# Patient Record
Sex: MALE | Race: WHITE | Employment: FULL TIME | URBAN - METROPOLITAN AREA
[De-identification: names, ages, dates, MRNs, and addresses within clinical notes are randomized per-mention and may not be internally consistent; named-entity substitution may affect disease eponyms.]

---

## 2024-09-18 ENCOUNTER — TELEPHONE (OUTPATIENT)
Age: 39
End: 2024-09-18

## 2024-09-25 NOTE — TELEPHONE ENCOUNTER
HIPAA Verified: N/A     Reason For Call:   Returning call to schedule appt    Message:  Patient returned call to schedule appt. Please call [phone]

## 2024-11-14 ENCOUNTER — TELEMEDICINE (OUTPATIENT)
Age: 39
End: 2024-11-14
Payer: COMMERCIAL

## 2024-11-14 DIAGNOSIS — Z62.9 HISTORY OF ADVERSE CHILDHOOD EXPERIENCES: ICD-10-CM

## 2024-11-14 DIAGNOSIS — Z65.8 PSYCHOSOCIAL DISTRESS: ICD-10-CM

## 2024-11-14 DIAGNOSIS — R41.844 EXECUTIVE FUNCTION DEFICIT: Primary | ICD-10-CM

## 2024-11-14 DIAGNOSIS — R47.89 WORD FINDING DIFFICULTY: ICD-10-CM

## 2024-11-14 DIAGNOSIS — R45.89 SYMPTOMS OF DEPRESSION: ICD-10-CM

## 2024-11-14 DIAGNOSIS — F41.9 ANXIETY: ICD-10-CM

## 2024-11-14 PROCEDURE — 90791 PSYCH DIAGNOSTIC EVALUATION: CPT | Performed by: PSYCHOLOGIST

## 2024-11-14 SDOH — HEALTH STABILITY - MENTAL HEALTH: PROBLEM RELATED TO UPBRINGING, UNSPECIFIED: Z62.9

## 2024-11-14 NOTE — PROGRESS NOTES
11/14/2024 at 7:47 AM    An electronic signature was used to authenticate this note.

## 2025-01-10 ENCOUNTER — PROCEDURE VISIT (OUTPATIENT)
Age: 40
End: 2025-01-10
Payer: COMMERCIAL

## 2025-01-10 DIAGNOSIS — F41.8 ANXIETY WITH SOMATIC FEATURES: ICD-10-CM

## 2025-01-10 DIAGNOSIS — Z62.9 HISTORY OF ADVERSE CHILDHOOD EXPERIENCES: ICD-10-CM

## 2025-01-10 DIAGNOSIS — Z91.49 HISTORY OF PSYCHOLOGICAL TRAUMA: ICD-10-CM

## 2025-01-10 DIAGNOSIS — F10.90 ALCOHOL USE DISORDER: ICD-10-CM

## 2025-01-10 DIAGNOSIS — R41.840 ATTENTION AND CONCENTRATION DEFICIT: Primary | ICD-10-CM

## 2025-01-10 DIAGNOSIS — Z65.8 PSYCHOSOCIAL DISTRESS: ICD-10-CM

## 2025-01-10 DIAGNOSIS — F41.1 GAD (GENERALIZED ANXIETY DISORDER): ICD-10-CM

## 2025-01-10 DIAGNOSIS — F34.1 DYSTHYMIA: ICD-10-CM

## 2025-01-10 PROCEDURE — 96138 PSYCL/NRPSYC TECH 1ST: CPT | Performed by: PSYCHOLOGIST

## 2025-01-10 PROCEDURE — 96139 PSYCL/NRPSYC TST TECH EA: CPT | Performed by: PSYCHOLOGIST

## 2025-01-10 PROCEDURE — 96136 PSYCL/NRPSYC TST PHY/QHP 1ST: CPT | Performed by: PSYCHOLOGIST

## 2025-01-10 PROCEDURE — 96131 PSYCL TST EVAL PHYS/QHP EA: CPT | Performed by: PSYCHOLOGIST

## 2025-01-10 PROCEDURE — 96130 PSYCL TST EVAL PHYS/QHP 1ST: CPT | Performed by: PSYCHOLOGIST

## 2025-01-10 SDOH — HEALTH STABILITY - MENTAL HEALTH: PROBLEM RELATED TO UPBRINGING, UNSPECIFIED: Z62.9

## 2025-01-10 NOTE — PROGRESS NOTES
Accordingly, test findings below do not appear to be the product of disingenuous effort.  Given the above observations, plus comments contained in the Mental Status section, the results of this examination are regarded as reasonably reliable and valid.    TEST RESULTS:  Quantitative test results are derived from comparisons to age and education corrected cohort normative data, where applicable.  Percentiles are included in these instances.  Qualitative test results are determined using clinical observations.    General Orientation and Awareness:  Orientation to person Yes  Time Yes  Place Yes  Circumstance Yes    Sensory-Perceptual and Motor Functioning:  Visual and auditory acuity: WNL  Gait and balance: WNL      Attention/Concentration:  Simple visuomotor tracking (55 percentile)    Average    Rapid information processing 3\" (68 percentile)    Average  Rapid information processing 2\" (37 percentile)    Average    On the Wender [place] Rating Scale, Mr. Giron's responses are consistent with childhood ADHD.     On a continuous performance test involving visual stimuli, there was no pervasive pattern of atypical scores. That said, difficulties with vigilance were suggested by the profile pattern.     Language:  Phonemic verbal fluency (84 percentile)     Above Average   Categorical verbal fluency (63 percentile)    Average    Memory and Learning:  Word list immediate recall (54 percentile)    Average  Word list short delayed recall (95 percentile)    Above Average  Word list long delayed recall (95 percentile)    Above Average  Forced Choice Recognition (75 percentile)    Above Average    Cognitive Tests of Executive Functioning:  Ability to think flexibly, Trailmaking B (45 percentile)   Average    Intellectual and Basic Cognitive Functioning (WAIS-IV)  Verbal Comprehension Index: 125 Percentile: 95   Superior   Similarities: 13    Percentile: 84   Vocabulary: 16   Percentile: 98        Information: 14   Percentile:

## 2025-01-24 ENCOUNTER — TELEMEDICINE (OUTPATIENT)
Age: 40
End: 2025-01-24
Payer: COMMERCIAL

## 2025-01-24 DIAGNOSIS — F41.8 ANXIETY WITH SOMATIC FEATURES: ICD-10-CM

## 2025-01-24 DIAGNOSIS — R41.840 ATTENTION AND CONCENTRATION DEFICIT: Primary | ICD-10-CM

## 2025-01-24 DIAGNOSIS — F41.1 GAD (GENERALIZED ANXIETY DISORDER): ICD-10-CM

## 2025-01-24 DIAGNOSIS — Z62.9 HISTORY OF ADVERSE CHILDHOOD EXPERIENCES: ICD-10-CM

## 2025-01-24 DIAGNOSIS — F34.1 DYSTHYMIA: ICD-10-CM

## 2025-01-24 DIAGNOSIS — F10.90 ALCOHOL USE DISORDER: ICD-10-CM

## 2025-01-24 DIAGNOSIS — Z91.49 HISTORY OF PSYCHOLOGICAL TRAUMA: ICD-10-CM

## 2025-01-24 DIAGNOSIS — Z65.8 PSYCHOSOCIAL DISTRESS: ICD-10-CM

## 2025-01-24 PROCEDURE — 90837 PSYTX W PT 60 MINUTES: CPT | Performed by: PSYCHOLOGIST

## 2025-01-24 SDOH — HEALTH STABILITY - MENTAL HEALTH: PROBLEM RELATED TO UPBRINGING, UNSPECIFIED: Z62.9

## 2025-01-24 NOTE — PROGRESS NOTES
Interactive Psychotherapy/office feedback        Interactive office feedback session with Mr. Giron.  I reviewed the results of the recent Neuropsychological Evaluation  including the observed areas of neurocognitive strengths and weaknesses. Education was provided regarding my diagnostic impressions, and treatment plan/options were discussed. I also answered numerous questions related to the clinical findings, including the various methods to improve cognition and mood. CBT, psychoeducation, and supportive psychotherapy techniques were utilized.     Patient's report placed in [facility] per patient request.     In addition to EMDR and ACT, I suggested that Mr. Giron consider internal family systems therapy.  He was encouraged to explore providers at the following site:  [email or web address]      Prior to seeing the patient I reviewed the records, including the previously completed report, the records in [facility], and any updated visits from other providers since I saw the patient last.       Diagnoses:      R/O ADHD  Dysthymia R/O Bipolar II Disorder  R/O Alcohol Use Disorder  Generalized Anxiety Disorder, provisional   Anxiety with somatic features  Psychosocial Distress  History of Psychological Trauma  History of Adverse Childhood Experiences    The patient will follow up with the referring provider, and reported being very pleased with the services provided.  Follow up with [facility] prn.         [id number] psychotherapy 60 Minutes    This note was created using voice recognition software. Despite editing, there may be syntax errors.         Aneesh Giron IV, was evaluated through a synchronous (real-time) audio-video encounter. The patient (or guardian if applicable) is aware that this is a billable service, which includes applicable co-pays. This Virtual Visit was conducted with patient's (and/or legal guardian's) consent. Patient identification was verified, and a caregiver was present when